# Patient Record
Sex: FEMALE | Race: WHITE
[De-identification: names, ages, dates, MRNs, and addresses within clinical notes are randomized per-mention and may not be internally consistent; named-entity substitution may affect disease eponyms.]

---

## 2019-03-18 ENCOUNTER — HOSPITAL ENCOUNTER (OUTPATIENT)
Dept: HOSPITAL 46 - DS | Age: 59
Discharge: HOME | End: 2019-03-18
Attending: SURGERY
Payer: COMMERCIAL

## 2019-03-18 VITALS — BODY MASS INDEX: 27.32 KG/M2 | HEIGHT: 66 IN | WEIGHT: 170 LBS

## 2019-03-18 DIAGNOSIS — K57.30: ICD-10-CM

## 2019-03-18 DIAGNOSIS — Z83.2: ICD-10-CM

## 2019-03-18 DIAGNOSIS — Z12.11: Primary | ICD-10-CM

## 2019-03-18 DIAGNOSIS — Z83.71: ICD-10-CM

## 2019-03-18 PROCEDURE — G0500 MOD SEDAT ENDO SERVICE >5YRS: HCPCS

## 2019-03-18 PROCEDURE — 0DJD8ZZ INSPECTION OF LOWER INTESTINAL TRACT, VIA NATURAL OR ARTIFICIAL OPENING ENDOSCOPIC: ICD-10-PCS | Performed by: SURGERY

## 2019-03-18 NOTE — NUR
03/18/19 1348 Melvina Woods
1340 PT ARRIVED TO PACU WITH EYES OPEN AND TALKING TO RN. PT DENIES
NAUSEA AND PAIN. PT REORINTED TO PACU AND PLAN OF CARE DISCUSSED WITH
PT. VSS.
 
1345 PT RESTING WITH EYES CLOSED.

## 2019-03-18 NOTE — OR
[facility]
                                    [address], [place]  [address]
_________________________________________________________________________________________
                                                                 Signed   
 
 
DATE OF OPERATION:
03/18/2019
 
SURGEON:
Tarsha Noriega MD
 
PREOPERATIVE DIAGNOSES:
1. Screening colonoscopy.
2. Family history of colon cancer in paternal aunt.
 
POSTOPERATIVE DIAGNOSIS:
Sigmoid diverticulosis.  No polyps.
 
PROCEDURE:
Total colonoscopy to cecum.
 
ANESTHESIA:
Intravenous sedation, fentanyl 100 mcg and versed 5 mg.
 
INDICATION:
This 58-year-old white woman is a patient of Dr. Frieda Dickson.  This is her initial colon
screening evaluation.  She has no symptoms of bleeding, diarrhea, or constipation.  She
does have family history of colon cancer in a paternal aunt.  She is admitted at this
time to undergo colonoscopy.  She understands the risks of bleeding, infection, and
perforation.  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
protein-C deficiency.  She herself has never had clotting issues. 
 
DESCRIPTION OF PROCEDURE:
The patient was brought to the endoscopy suite and placed in lateral decubitus position,
given intravenous sedation to a point of slurred speech and nystagmus.  Digital rectal
examination was normal. 
 
The Olympus flexible endoscope was passed into the rectum and manipulated throughout the
colon ultimately intubating the cecum itself.  The ileocecal valve and appendiceal
orifice were normal.  The scope was withdrawn from that point.  Examination throughout
showed no sign of polyps or colitis, but did confirm diverticular changes of the sigmoid
and left colon.  Retroflexed view of the rectum was normal.  The scope was removed.  The
patient was taken to recovery room in good condition. 
 
CONCLUDING DIAGNOSIS:
Diverticulosis.  No evidence of polyps.
 
 
    Electronically Signed By: TARSHA NORIEGA MD  03/18/19 1451
_________________________________________________________________________________________
PATIENT NAME:     LEVON LOMAS                       
MEDICAL RECORD #: [id number]            OPERATIVE REPORT              
          ACCT #: [id number]  
DATE OF BIRTH:   [birth year]            REPORT #: [id number]      
PHYSICIAN:        TARSHA NORIEGA MD                 
PCP:              FRIEDA DICKSON MD                
REPORT IS CONFIDENTIAL AND NOT TO BE RELEASED WITHOUT AUTHORIZATION
 
 
                                  [facility]
                                    [address], [place]  [address]
_________________________________________________________________________________________
                                                                 Signed   
 
 
PLAN:
Recommend high-fiber diet.  Repeat colonoscopy in 10 years or sooner if clinically
indicated.  She will return to the ongoing care of Dr. Frieda Dickson. 
 
 
 
            ________________________________________
            MD NILTON Ray/MODL
Job #:  [id number]
DD:  03/18/2019 13:47:27
DT:  03/18/2019 14:17:44
 
cc:            Frieda Dickson MD
 
 
Copies:                                
~
 
 
 
 
 
 
 
 
 
 
 
 
 
 
 
 
 
 
 
 
 
 
 
    Electronically Signed By: TARSHA NORIEGA MD  03/18/19 1451
_________________________________________________________________________________________
PATIENT NAME:     LEVON LOMAS                       
MEDICAL RECORD #: [id number]            OPERATIVE REPORT              
          ACCT #: [id number]  
DATE OF BIRTH:   [birth year]            REPORT #: [id number]      
PHYSICIAN:        TARSHA NORIEGA MD                 
PCP:              FRIEDA DICKSON MD                
REPORT IS CONFIDENTIAL AND NOT TO BE RELEASED WITHOUT AUTHORIZATION